# Patient Record
Sex: FEMALE | Race: WHITE | NOT HISPANIC OR LATINO | URBAN - METROPOLITAN AREA
[De-identification: names, ages, dates, MRNs, and addresses within clinical notes are randomized per-mention and may not be internally consistent; named-entity substitution may affect disease eponyms.]

---

## 2023-05-12 ENCOUNTER — TELEPHONE (OUTPATIENT)
Dept: PRIMARY CARE | Facility: CLINIC | Age: 78
End: 2023-05-12
Payer: MEDICARE

## 2023-05-12 DIAGNOSIS — M81.0 OSTEOPOROSIS, UNSPECIFIED OSTEOPOROSIS TYPE, UNSPECIFIED PATHOLOGICAL FRACTURE PRESENCE: ICD-10-CM

## 2023-05-12 DIAGNOSIS — E78.5 HYPERLIPIDEMIA, UNSPECIFIED HYPERLIPIDEMIA TYPE: ICD-10-CM

## 2023-05-12 RX ORDER — ALENDRONATE SODIUM 70 MG/1
TABLET ORAL
Qty: 12 TABLET | Refills: 1 | Status: SHIPPED | OUTPATIENT
Start: 2023-05-12 | End: 2023-10-02

## 2023-05-12 RX ORDER — PRAVASTATIN SODIUM 20 MG/1
20 TABLET ORAL NIGHTLY
Qty: 90 TABLET | Refills: 1 | Status: SHIPPED | OUTPATIENT
Start: 2023-05-12 | End: 2023-10-05

## 2023-06-21 ENCOUNTER — OFFICE VISIT (OUTPATIENT)
Dept: PRIMARY CARE | Facility: CLINIC | Age: 78
End: 2023-06-21
Payer: MEDICARE

## 2023-06-21 VITALS
DIASTOLIC BLOOD PRESSURE: 62 MMHG | WEIGHT: 125 LBS | BODY MASS INDEX: 24.54 KG/M2 | SYSTOLIC BLOOD PRESSURE: 135 MMHG | TEMPERATURE: 97.5 F | OXYGEN SATURATION: 98 % | HEIGHT: 60 IN | HEART RATE: 61 BPM

## 2023-06-21 DIAGNOSIS — M81.0 AGE-RELATED OSTEOPOROSIS WITHOUT CURRENT PATHOLOGICAL FRACTURE: ICD-10-CM

## 2023-06-21 DIAGNOSIS — Z00.00 VISIT FOR PREVENTIVE HEALTH EXAMINATION: Primary | ICD-10-CM

## 2023-06-21 DIAGNOSIS — N18.31 STAGE 3A CHRONIC KIDNEY DISEASE (MULTI): ICD-10-CM

## 2023-06-21 DIAGNOSIS — E78.2 MIXED HYPERLIPIDEMIA: ICD-10-CM

## 2023-06-21 DIAGNOSIS — R05.8 DRY COUGH: ICD-10-CM

## 2023-06-21 PROBLEM — G89.29 CHRONIC LOWER BACK PAIN: Status: ACTIVE | Noted: 2023-06-21

## 2023-06-21 PROBLEM — E78.5 HYPERLIPIDEMIA: Status: ACTIVE | Noted: 2023-06-21

## 2023-06-21 PROBLEM — M17.9 OSTEOARTHRITIS OF KNEE: Status: ACTIVE | Noted: 2023-06-21

## 2023-06-21 PROBLEM — M54.50 CHRONIC LOWER BACK PAIN: Status: ACTIVE | Noted: 2023-06-21

## 2023-06-21 PROCEDURE — 1170F FXNL STATUS ASSESSED: CPT | Performed by: FAMILY MEDICINE

## 2023-06-21 PROCEDURE — G0444 DEPRESSION SCREEN ANNUAL: HCPCS | Performed by: FAMILY MEDICINE

## 2023-06-21 PROCEDURE — 93000 ELECTROCARDIOGRAM COMPLETE: CPT | Performed by: FAMILY MEDICINE

## 2023-06-21 PROCEDURE — 99497 ADVNCD CARE PLAN 30 MIN: CPT | Performed by: FAMILY MEDICINE

## 2023-06-21 PROCEDURE — 1159F MED LIST DOCD IN RCRD: CPT | Performed by: FAMILY MEDICINE

## 2023-06-21 PROCEDURE — 1036F TOBACCO NON-USER: CPT | Performed by: FAMILY MEDICINE

## 2023-06-21 PROCEDURE — 1160F RVW MEDS BY RX/DR IN RCRD: CPT | Performed by: FAMILY MEDICINE

## 2023-06-21 PROCEDURE — 1157F ADVNC CARE PLAN IN RCRD: CPT | Performed by: FAMILY MEDICINE

## 2023-06-21 PROCEDURE — G0439 PPPS, SUBSEQ VISIT: HCPCS | Performed by: FAMILY MEDICINE

## 2023-06-21 PROCEDURE — 99214 OFFICE O/P EST MOD 30 MIN: CPT | Performed by: FAMILY MEDICINE

## 2023-06-21 RX ORDER — GINGER ROOT/GINGER ROOT EXT 262.5 MG
25000 CAPSULE ORAL DAILY
COMMUNITY

## 2023-06-21 RX ORDER — EPINEPHRINE 0.22MG
AEROSOL WITH ADAPTER (ML) INHALATION
COMMUNITY
Start: 2019-06-05

## 2023-06-21 RX ORDER — LUTEIN 6 MG
2 TABLET ORAL DAILY
COMMUNITY
Start: 2019-06-05

## 2023-06-21 RX ORDER — PANTOPRAZOLE SODIUM 40 MG/1
40 TABLET, DELAYED RELEASE ORAL DAILY
Qty: 30 TABLET | Refills: 0 | Status: SHIPPED | OUTPATIENT
Start: 2023-06-21 | End: 2023-08-20

## 2023-06-21 RX ORDER — ZINC GLUCONATE 50 MG
1 TABLET ORAL DAILY
COMMUNITY
Start: 2019-06-05

## 2023-06-21 RX ORDER — CHOLECALCIFEROL (VITAMIN D3) 25 MCG
TABLET ORAL
COMMUNITY
Start: 2016-05-26

## 2023-06-21 RX ORDER — MONTELUKAST SODIUM 10 MG/1
10 TABLET ORAL NIGHTLY
Qty: 30 TABLET | Refills: 0 | Status: SHIPPED | OUTPATIENT
Start: 2023-06-21 | End: 2024-04-10 | Stop reason: SDUPTHER

## 2023-06-21 RX ORDER — ZINC GLUCONATE 100 MG
TABLET ORAL
COMMUNITY
Start: 2019-06-05

## 2023-06-21 RX ORDER — CALCIUM CARBONATE/VITAMIN D3 500-10/5ML
LIQUID (ML) ORAL
COMMUNITY
Start: 2019-06-05

## 2023-06-21 ASSESSMENT — LIFESTYLE VARIABLES
HOW OFTEN DO YOU HAVE A DRINK CONTAINING ALCOHOL: MONTHLY OR LESS
AUDIT-C TOTAL SCORE: 1
SKIP TO QUESTIONS 9-10: 1
HOW MANY STANDARD DRINKS CONTAINING ALCOHOL DO YOU HAVE ON A TYPICAL DAY: 1 OR 2
HOW OFTEN DO YOU HAVE SIX OR MORE DRINKS ON ONE OCCASION: NEVER

## 2023-06-21 ASSESSMENT — PATIENT HEALTH QUESTIONNAIRE - PHQ9
2. FEELING DOWN, DEPRESSED OR HOPELESS: NOT AT ALL
SUM OF ALL RESPONSES TO PHQ9 QUESTIONS 1 AND 2: 0
1. LITTLE INTEREST OR PLEASURE IN DOING THINGS: NOT AT ALL

## 2023-06-21 ASSESSMENT — ACTIVITIES OF DAILY LIVING (ADL)
DRESSING: INDEPENDENT
GROCERY_SHOPPING: INDEPENDENT
MANAGING_FINANCES: INDEPENDENT
TAKING_MEDICATION: INDEPENDENT
DOING_HOUSEWORK: INDEPENDENT
BATHING: INDEPENDENT

## 2023-06-21 NOTE — PROGRESS NOTES
Subjective   Patient ID: Charity Weber is a 77 y.o. female who presents for Medicare Annual Wellness Visit Subsequent.    Assessment/Plan     Problem List Items Addressed This Visit       Visit for preventive health examination - Primary    Hyperlipidemia    Relevant Orders    ECG 12 lead (Clinic Performed)    Osteoporosis    Stage 3a chronic kidney disease     Other Visit Diagnoses       Dry cough        Relevant Medications    montelukast (Singulair) 10 mg tablet    pantoprazole (ProtoNix) 40 mg EC tablet        Mammogram and bone density July 2022 reviewed  eKg today    Received new shingles vaccine  Up-to-date with pneumonia vaccine  Up-to-date with COVID-19 vaccine    cologuard -positive in September 2020 had colonoscopy in November 2020    Patient can call for medication refills  Follow-up in 12 months. Patient understands and in agreement with plan    HPI  77-year-old female here for Medicare wellness visit      follow-up on on recent CT colonography which was done as patient could not complete colonoscopy the result for CT colonography did not reveal any abnormalities except mild diverticulosis.   Echocardiogram showed EF of 55 to 60% small pericardial effusion       hyperlipidemia, vitamin D deficiency, chronic low back pain  Osteoporosis/osteopenia  Chronic kidney disease stage III  No new sign and symptoms      Revent labs done on 5/24/23 - reviewed  cbc cmp lipid us uric acid WNL  CKD 3 stage gfr 47    C/o on and off dry cough for a long time will consider singulair and ppi for  a month      Advance directives:. Advanced Care Planning discussed and documented advance care plan or surrogate decision maker documented in the medical record.   A Conversation about Advance directives of at least 16 minutes has occurred. Actual time spent: I spent >16 minutes discussing Advance Care Planning including the explanation and discussion of advance directives. If patient does not have current up to date documents,  examples and information provided on how to create both Living Will and Power of . Patient was encouraged to work on completing these documents.  Conversation with: The conversation with the patient and/or participants was voluntary.  Documents discussed and/or completed: Living Will was discussed with participants. Healthcare POA was discussed with participants. Patient educated on how to obtain Living Will and Power of . Discussed patient end of life choices.   Patient has living will and patient's -Bladimir daughter is healthcare power of .  Patient is full code.    Allergies   Allergen Reactions    Methylprednisolone Unknown    Silicone Unknown       Current Outpatient Medications   Medication Sig Dispense Refill    alendronate (Fosamax) 70 mg tablet TAKE 1 TABLET BY MOUTH ONCE WEEKLY 12 tablet 1    beta carotene (Vitamin A) 7,500 mcg (25,000 unit) capsule Take 1 capsule (25,000 Units) by mouth once daily.      cholecalciferol (Vitamin D-3) 25 MCG (1000 UT) tablet Take by mouth.      coenzyme Q-10 100 mg capsule Take by mouth.      copper gluconate 2 mg capsule Take by mouth.      fish oil concentrate (Omega-3) 120-180 mg capsule Take by mouth once daily.      lutein 6 mg tablet Take 2 tablets by mouth once daily.      phytonadione, vit K1, (vitamin k) 100 mcg tablet Take by mouth.      pravastatin (Pravachol) 20 mg tablet Take 1 tablet (20 mg) by mouth once daily at bedtime. 90 tablet 1    zinc gluconate 50 mg tablet Take 1 tablet (50 mg) by mouth once daily.      montelukast (Singulair) 10 mg tablet Take 1 tablet (10 mg) by mouth once daily at bedtime. 30 tablet 0    pantoprazole (ProtoNix) 40 mg EC tablet Take 1 tablet (40 mg) by mouth once daily. Do not crush, chew, or split. 30 tablet 0     No current facility-administered medications for this visit.       Objective   Visit Vitals  /62 (BP Location: Right arm, Patient Position: Sitting)   Pulse 61   Temp 36.4 °C (97.5 °F)    Ht 1.524 m (5')   Wt 56.7 kg (125 lb)   SpO2 98%   BMI 24.41 kg/m²   Smoking Status Never   BSA 1.55 m²     Physical Exam  Constitutional:       General: He is not in acute distress.     Appearance: Normal appearance.   HENT:      Head: Normocephalic and atraumatic.      Nose: Nose normal.   Eyes:      Extraocular Movements: Extraocular movements intact.      Conjunctiva/sclera: Conjunctivae normal.   Cardiovascular:      Rate and Rhythm: Normal rate and regular rhythm.   Pulmonary:      Effort: Pulmonary effort is normal.      Breath sounds: Normal breath sounds.   Skin:     General: Skin is warm.   Neurological:      Mental Status: He is alert and oriented to person, place, and time.   Psychiatric:         Mood and Affect: Mood normal.         Behavior: Behavior normal.   Immunization History   Administered Date(s) Administered    Moderna SARS-CoV-2 Vaccination 04/07/2021, 05/05/2021, 11/11/2021, 04/04/2022       Review of Systems    No visits with results within 4 Month(s) from this visit.   Latest known visit with results is:   Legacy Encounter on 06/15/2022   Component Date Value Ref Range Status    Vitamin D, 25-Hydroxy 06/15/2022 75  ng/mL Final    Vitamin B-12 06/15/2022 605  211 - 911 pg/mL Final    TSH 06/15/2022 1.79  0.44 - 3.98 mIU/L Final       Radiology: Reviewed imaging in powerchart.  No results found.    No family history on file.  Social History     Socioeconomic History    Marital status:      Spouse name: None    Number of children: None    Years of education: None    Highest education level: None   Occupational History    None   Tobacco Use    Smoking status: Never    Smokeless tobacco: Never   Substance and Sexual Activity    Alcohol use: Not Currently    Drug use: Never    Sexual activity: None   Other Topics Concern    None   Social History Narrative    None     Social Determinants of Health     Financial Resource Strain: Not on file   Food Insecurity: Not on file   Transportation  Needs: Not on file   Physical Activity: Not on file   Stress: Not on file   Social Connections: Not on file   Intimate Partner Violence: Not on file   Housing Stability: Not on file     Past Medical History:   Diagnosis Date    Personal history of other endocrine, nutritional and metabolic disease 11/03/2016    History of hyperlipidemia     Past Surgical History:   Procedure Laterality Date    BREAST LUMPECTOMY  05/16/2014    Left Breast Lumpectomy    FOOT SURGERY  05/16/2014    Foot Surgery    HYSTERECTOMY  05/16/2014    Hysterectomy    KNEE ARTHROSCOPY W/ DEBRIDEMENT  05/16/2014    Arthroscopy Knee Left    OTHER SURGICAL HISTORY  05/16/2014    Left Breast Biopsy During Breast Surgery       Charting was completed using voice recognition technology and may include unintended errors.

## 2023-10-01 DIAGNOSIS — M81.0 OSTEOPOROSIS, UNSPECIFIED OSTEOPOROSIS TYPE, UNSPECIFIED PATHOLOGICAL FRACTURE PRESENCE: ICD-10-CM

## 2023-10-02 RX ORDER — ALENDRONATE SODIUM 70 MG/1
TABLET ORAL
Qty: 12 TABLET | Refills: 2 | Status: SHIPPED | OUTPATIENT
Start: 2023-10-02 | End: 2023-10-23 | Stop reason: SDUPTHER

## 2023-10-05 DIAGNOSIS — E78.5 HYPERLIPIDEMIA, UNSPECIFIED HYPERLIPIDEMIA TYPE: ICD-10-CM

## 2023-10-05 RX ORDER — PRAVASTATIN SODIUM 20 MG/1
20 TABLET ORAL NIGHTLY
Qty: 90 TABLET | Refills: 2 | Status: SHIPPED | OUTPATIENT
Start: 2023-10-05 | End: 2023-10-23 | Stop reason: SDUPTHER

## 2023-10-23 DIAGNOSIS — E78.5 HYPERLIPIDEMIA, UNSPECIFIED HYPERLIPIDEMIA TYPE: ICD-10-CM

## 2023-10-23 DIAGNOSIS — M81.0 OSTEOPOROSIS, UNSPECIFIED OSTEOPOROSIS TYPE, UNSPECIFIED PATHOLOGICAL FRACTURE PRESENCE: ICD-10-CM

## 2023-10-23 RX ORDER — ALENDRONATE SODIUM 70 MG/1
TABLET ORAL
Qty: 12 TABLET | Refills: 2 | Status: SHIPPED | OUTPATIENT
Start: 2023-10-23

## 2023-10-23 RX ORDER — PRAVASTATIN SODIUM 20 MG/1
20 TABLET ORAL NIGHTLY
Qty: 90 TABLET | Refills: 2 | Status: SHIPPED | OUTPATIENT
Start: 2023-10-23 | End: 2024-05-29 | Stop reason: SDUPTHER

## 2024-03-12 ENCOUNTER — TELEPHONE (OUTPATIENT)
Dept: PRIMARY CARE | Facility: CLINIC | Age: 79
End: 2024-03-12

## 2024-03-12 ENCOUNTER — OFFICE VISIT (OUTPATIENT)
Dept: PRIMARY CARE | Facility: CLINIC | Age: 79
End: 2024-03-12
Payer: MEDICARE

## 2024-03-12 VITALS
HEART RATE: 79 BPM | BODY MASS INDEX: 24.54 KG/M2 | WEIGHT: 125 LBS | SYSTOLIC BLOOD PRESSURE: 137 MMHG | DIASTOLIC BLOOD PRESSURE: 61 MMHG | OXYGEN SATURATION: 96 % | TEMPERATURE: 97.3 F | HEIGHT: 60 IN

## 2024-03-12 DIAGNOSIS — B96.89 ACUTE BRONCHITIS, BACTERIAL: Primary | ICD-10-CM

## 2024-03-12 DIAGNOSIS — J20.8 ACUTE BRONCHITIS, BACTERIAL: Primary | ICD-10-CM

## 2024-03-12 PROCEDURE — 1036F TOBACCO NON-USER: CPT | Performed by: FAMILY MEDICINE

## 2024-03-12 PROCEDURE — 99213 OFFICE O/P EST LOW 20 MIN: CPT | Performed by: FAMILY MEDICINE

## 2024-03-12 PROCEDURE — 1157F ADVNC CARE PLAN IN RCRD: CPT | Performed by: FAMILY MEDICINE

## 2024-03-12 PROCEDURE — 1160F RVW MEDS BY RX/DR IN RCRD: CPT | Performed by: FAMILY MEDICINE

## 2024-03-12 PROCEDURE — 1159F MED LIST DOCD IN RCRD: CPT | Performed by: FAMILY MEDICINE

## 2024-03-12 RX ORDER — BENZONATATE 100 MG/1
200 CAPSULE ORAL 3 TIMES DAILY PRN
Qty: 30 CAPSULE | Refills: 0 | Status: SHIPPED | OUTPATIENT
Start: 2024-03-12 | End: 2024-03-17

## 2024-03-12 RX ORDER — BROMPHENIRAMINE MALEATE, PSEUDOEPHEDRINE HYDROCHLORIDE, AND DEXTROMETHORPHAN HYDROBROMIDE 2; 30; 10 MG/5ML; MG/5ML; MG/5ML
5 SYRUP ORAL 4 TIMES DAILY PRN
Qty: 120 ML | Refills: 0 | Status: SHIPPED | OUTPATIENT
Start: 2024-03-12 | End: 2024-03-22

## 2024-03-12 RX ORDER — DOXYCYCLINE 100 MG/1
100 CAPSULE ORAL 2 TIMES DAILY
Qty: 20 CAPSULE | Refills: 0 | Status: SHIPPED | OUTPATIENT
Start: 2024-03-12 | End: 2024-03-22

## 2024-03-12 NOTE — PROGRESS NOTES
Subjective   Patient ID: Charity Weber is a 78 y.o. female who presents for Nasal Congestion and Cough.    Assessment/Plan     Problem List Items Addressed This Visit    None      HPI    78-year-old female complaining of cough congestion phlegm production runny stuffy nose going on since last 5 days without any improvement using over-the-counter medication    No fever chills  Complaining of headache but no dizziness confusion    No vomiting or diarrhea    Denies any chest pain shortness of breath      Most likely acute bronchitis we will consider above medication advised patient to use Singulair Protonix the day provided all we will consider doxycycline with her symptomatic treatment patient agrees call us or follow-up if symptoms are worsening    Allergies   Allergen Reactions    Methylprednisolone Unknown    Silicone Unknown       Current Outpatient Medications   Medication Sig Dispense Refill    alendronate (Fosamax) 70 mg tablet TAKE 1 TABLET BY MOUTH WEEKLY 12 tablet 2    beta carotene (Vitamin A) 7,500 mcg (25,000 unit) capsule Take 1 capsule (25,000 Units) by mouth once daily.      cholecalciferol (Vitamin D-3) 25 MCG (1000 UT) tablet Take by mouth.      coenzyme Q-10 100 mg capsule Take by mouth.      copper gluconate 2 mg capsule Take by mouth.      fish oil concentrate (Omega-3) 120-180 mg capsule Take by mouth once daily.      lutein 6 mg tablet Take 2 tablets by mouth once daily.      phytonadione, vit K1, (vitamin k) 100 mcg tablet Take by mouth.      pravastatin (Pravachol) 20 mg tablet Take 1 tablet (20 mg) by mouth once daily at bedtime. 90 tablet 2    zinc gluconate 50 mg tablet Take 1 tablet (50 mg) by mouth once daily.      montelukast (Singulair) 10 mg tablet Take 1 tablet (10 mg) by mouth once daily at bedtime. 30 tablet 0    pantoprazole (ProtoNix) 40 mg EC tablet Take 1 tablet (40 mg) by mouth once daily. Do not crush, chew, or split. 30 tablet 0     No current facility-administered medications  for this visit.       Objective   Visit Vitals  /61 (BP Location: Left arm, Patient Position: Sitting, BP Cuff Size: Adult)   Pulse 79   Temp 36.3 °C (97.3 °F)   Ht 1.524 m (5')   Wt 56.7 kg (125 lb)   SpO2 96%   BMI 24.41 kg/m²   Smoking Status Never   BSA 1.55 m²     Physical Exam  Constitutional:       General: He is not in acute distress.     Appearance: Normal appearance.   HENT:      Head: Normocephalic and atraumatic.      Nose: Nose normal.   Eyes:      Extraocular Movements: Extraocular movements intact.      Conjunctiva/sclera: Conjunctivae normal.   Cardiovascular:      Rate and Rhythm: Normal rate and regular rhythm.   Pulmonary:      Effort: Pulmonary effort is normal.      Breath sounds: Normal breath sounds.   Skin:     General: Skin is warm.   Neurological:      Mental Status: He is alert and oriented to person, place, and time.   Psychiatric:         Mood and Affect: Mood normal.         Behavior: Behavior normal.     Posterior pharyngeal wall erythema present    Bilateral nasal mucosa edema erythema present  Immunization History   Administered Date(s) Administered    Moderna COVID-19 vaccine, Fall 2023, 12 yeasrs and older (50mcg/0.5mL) 10/29/2023    Moderna COVID-19 vaccine, bivalent, blue cap/gray label *Check age/dose* 09/28/2022    Moderna SARS-CoV-2 Vaccination 04/07/2021, 05/05/2021, 11/11/2021, 04/04/2022       Review of Systems    No visits with results within 4 Month(s) from this visit.   Latest known visit with results is:   Legacy Encounter on 06/15/2022   Component Date Value Ref Range Status    Vitamin D, 25-Hydroxy 06/15/2022 75  ng/mL Final    Vitamin B-12 06/15/2022 605  211 - 911 pg/mL Final    TSH 06/15/2022 1.79  0.44 - 3.98 mIU/L Final       Radiology: Reviewed imaging in powerchart.  No results found.    No family history on file.  Social History     Socioeconomic History    Marital status:      Spouse name: None    Number of children: None    Years of education:  None    Highest education level: None   Occupational History    None   Tobacco Use    Smoking status: Never    Smokeless tobacco: Never   Substance and Sexual Activity    Alcohol use: Not Currently    Drug use: Never    Sexual activity: None   Other Topics Concern    None   Social History Narrative    None     Social Determinants of Health     Financial Resource Strain: Not on file   Food Insecurity: Not on file   Transportation Needs: Not on file   Physical Activity: Not on file   Stress: Not on file   Social Connections: Not on file   Intimate Partner Violence: Not on file   Housing Stability: Not on file     Past Medical History:   Diagnosis Date    Personal history of other endocrine, nutritional and metabolic disease 11/03/2016    History of hyperlipidemia     Past Surgical History:   Procedure Laterality Date    BREAST LUMPECTOMY  05/16/2014    Left Breast Lumpectomy    FOOT SURGERY  05/16/2014    Foot Surgery    HYSTERECTOMY  05/16/2014    Hysterectomy    KNEE ARTHROSCOPY W/ DEBRIDEMENT  05/16/2014    Arthroscopy Knee Left    OTHER SURGICAL HISTORY  05/16/2014    Left Breast Biopsy During Breast Surgery       Charting was completed using voice recognition technology and may include unintended errors.

## 2024-03-14 ENCOUNTER — TELEPHONE (OUTPATIENT)
Dept: PRIMARY CARE | Facility: CLINIC | Age: 79
End: 2024-03-14
Payer: MEDICARE

## 2024-03-14 NOTE — TELEPHONE ENCOUNTER
Pt wants to know if she can take cold and flu (Tylenol) medication and nyquil at night with the new five medications that she was recently given

## 2024-03-18 ENCOUNTER — TELEPHONE (OUTPATIENT)
Dept: PRIMARY CARE | Facility: CLINIC | Age: 79
End: 2024-03-18
Payer: MEDICARE

## 2024-03-19 NOTE — TELEPHONE ENCOUNTER
PT is calling back and stated she was told to stop the new medications prescribed on 3/12 from Dr Hopper because she was throwing up and just take the antibiotic.    She is returning your call.

## 2024-04-10 ENCOUNTER — LAB (OUTPATIENT)
Dept: LAB | Facility: LAB | Age: 79
End: 2024-04-10
Payer: MEDICARE

## 2024-04-10 ENCOUNTER — OFFICE VISIT (OUTPATIENT)
Dept: PRIMARY CARE | Facility: CLINIC | Age: 79
End: 2024-04-10
Payer: MEDICARE

## 2024-04-10 ENCOUNTER — TELEPHONE (OUTPATIENT)
Dept: PRIMARY CARE | Facility: CLINIC | Age: 79
End: 2024-04-10

## 2024-04-10 VITALS
BODY MASS INDEX: 24.74 KG/M2 | WEIGHT: 126 LBS | DIASTOLIC BLOOD PRESSURE: 70 MMHG | SYSTOLIC BLOOD PRESSURE: 120 MMHG | HEART RATE: 78 BPM | HEIGHT: 60 IN | OXYGEN SATURATION: 97 % | TEMPERATURE: 97.4 F

## 2024-04-10 DIAGNOSIS — R05.1 ACUTE COUGH: Primary | ICD-10-CM

## 2024-04-10 DIAGNOSIS — R05.1 ACUTE COUGH: ICD-10-CM

## 2024-04-10 DIAGNOSIS — R05.8 DRY COUGH: ICD-10-CM

## 2024-04-10 LAB
ANION GAP SERPL CALC-SCNC: 13 MMOL/L (ref 10–20)
BASOPHILS # BLD AUTO: 0.04 X10*3/UL (ref 0–0.1)
BASOPHILS NFR BLD AUTO: 0.8 %
BUN SERPL-MCNC: 14 MG/DL (ref 6–23)
CALCIUM SERPL-MCNC: 9.7 MG/DL (ref 8.6–10.6)
CHLORIDE SERPL-SCNC: 105 MMOL/L (ref 98–107)
CO2 SERPL-SCNC: 28 MMOL/L (ref 21–32)
CREAT SERPL-MCNC: 0.97 MG/DL (ref 0.5–1.05)
CRP SERPL-MCNC: 0.12 MG/DL
EGFRCR SERPLBLD CKD-EPI 2021: 60 ML/MIN/1.73M*2
EOSINOPHIL # BLD AUTO: 0.09 X10*3/UL (ref 0–0.4)
EOSINOPHIL NFR BLD AUTO: 1.8 %
ERYTHROCYTE [DISTWIDTH] IN BLOOD BY AUTOMATED COUNT: 12.7 % (ref 11.5–14.5)
GLUCOSE SERPL-MCNC: 95 MG/DL (ref 74–99)
HCT VFR BLD AUTO: 36.3 % (ref 36–46)
HGB BLD-MCNC: 12.3 G/DL (ref 12–16)
IMM GRANULOCYTES # BLD AUTO: 0.01 X10*3/UL (ref 0–0.5)
IMM GRANULOCYTES NFR BLD AUTO: 0.2 % (ref 0–0.9)
LYMPHOCYTES # BLD AUTO: 1.17 X10*3/UL (ref 0.8–3)
LYMPHOCYTES NFR BLD AUTO: 23 %
MCH RBC QN AUTO: 32.6 PG (ref 26–34)
MCHC RBC AUTO-ENTMCNC: 33.9 G/DL (ref 32–36)
MCV RBC AUTO: 96 FL (ref 80–100)
MONOCYTES # BLD AUTO: 0.38 X10*3/UL (ref 0.05–0.8)
MONOCYTES NFR BLD AUTO: 7.5 %
NEUTROPHILS # BLD AUTO: 3.4 X10*3/UL (ref 1.6–5.5)
NEUTROPHILS NFR BLD AUTO: 66.7 %
NRBC BLD-RTO: 0 /100 WBCS (ref 0–0)
PLATELET # BLD AUTO: 265 X10*3/UL (ref 150–450)
POTASSIUM SERPL-SCNC: 4.2 MMOL/L (ref 3.5–5.3)
RBC # BLD AUTO: 3.77 X10*6/UL (ref 4–5.2)
SODIUM SERPL-SCNC: 142 MMOL/L (ref 136–145)
WBC # BLD AUTO: 5.1 X10*3/UL (ref 4.4–11.3)

## 2024-04-10 PROCEDURE — 1160F RVW MEDS BY RX/DR IN RCRD: CPT | Performed by: FAMILY MEDICINE

## 2024-04-10 PROCEDURE — 1157F ADVNC CARE PLAN IN RCRD: CPT | Performed by: FAMILY MEDICINE

## 2024-04-10 PROCEDURE — 85025 COMPLETE CBC W/AUTO DIFF WBC: CPT

## 2024-04-10 PROCEDURE — 99213 OFFICE O/P EST LOW 20 MIN: CPT | Performed by: FAMILY MEDICINE

## 2024-04-10 PROCEDURE — 86140 C-REACTIVE PROTEIN: CPT

## 2024-04-10 PROCEDURE — 1036F TOBACCO NON-USER: CPT | Performed by: FAMILY MEDICINE

## 2024-04-10 PROCEDURE — 80048 BASIC METABOLIC PNL TOTAL CA: CPT

## 2024-04-10 PROCEDURE — 36415 COLL VENOUS BLD VENIPUNCTURE: CPT

## 2024-04-10 PROCEDURE — 1159F MED LIST DOCD IN RCRD: CPT | Performed by: FAMILY MEDICINE

## 2024-04-10 RX ORDER — CODEINE PHOSPHATE AND GUAIFENESIN 10; 100 MG/5ML; MG/5ML
5 SOLUTION ORAL EVERY 6 HOURS PRN
Qty: 140 ML | Refills: 0 | Status: SHIPPED | OUTPATIENT
Start: 2024-04-10 | End: 2024-04-17

## 2024-04-10 RX ORDER — AZITHROMYCIN 250 MG/1
TABLET, FILM COATED ORAL DAILY
Qty: 6 TABLET | Refills: 0 | Status: SHIPPED | OUTPATIENT
Start: 2024-04-10 | End: 2024-04-14

## 2024-04-10 RX ORDER — CETIRIZINE HYDROCHLORIDE 10 MG/1
10 TABLET ORAL DAILY
Qty: 30 TABLET | Refills: 0 | Status: SHIPPED | OUTPATIENT
Start: 2024-04-10 | End: 2024-07-09

## 2024-04-10 RX ORDER — MONTELUKAST SODIUM 10 MG/1
10 TABLET ORAL NIGHTLY
Qty: 30 TABLET | Refills: 0 | Status: SHIPPED | OUTPATIENT
Start: 2024-04-10 | End: 2024-10-07

## 2024-04-10 RX ORDER — AZELASTINE 1 MG/ML
1 SPRAY, METERED NASAL 2 TIMES DAILY
Qty: 30 ML | Refills: 0 | Status: SHIPPED | OUTPATIENT
Start: 2024-04-10 | End: 2025-04-10

## 2024-04-10 NOTE — PROGRESS NOTES
Subjective   Patient ID: Charity Weber is a 78 y.o. female who presents for No chief complaint on file..    Assessment/Plan     Problem List Items Addressed This Visit    None      HPI    78-year-old female complaining of cough congestion phlegm production runny stuffy nose going on since last 5 days without any improvement using over-the-counter medication    No fever chills  Complaining of headache but no dizziness confusion    No vomiting or diarrhea    Denies any chest pain shortness of breath    With bronchitis previously doxycycline and symptomatic treatment tried  Patient states she is still coughing complaining a little shortness of breath    But no fever chills no phlegm production  Will consider above medication and initiate antibiotic only if symptoms are worsening  Patient agrees and if symptoms does not improve will consider CT chest with IV contrast  Lab work today    Allergies   Allergen Reactions    Methylprednisolone Unknown    Silicone Unknown       Current Outpatient Medications   Medication Sig Dispense Refill    alendronate (Fosamax) 70 mg tablet TAKE 1 TABLET BY MOUTH WEEKLY 12 tablet 2    beta carotene (Vitamin A) 7,500 mcg (25,000 unit) capsule Take 1 capsule (25,000 Units) by mouth once daily.      cholecalciferol (Vitamin D-3) 25 MCG (1000 UT) tablet Take by mouth.      coenzyme Q-10 100 mg capsule Take by mouth.      copper gluconate 2 mg capsule Take by mouth.      fish oil concentrate (Omega-3) 120-180 mg capsule Take by mouth once daily.      lutein 6 mg tablet Take 2 tablets by mouth once daily.      montelukast (Singulair) 10 mg tablet Take 1 tablet (10 mg) by mouth once daily at bedtime. 30 tablet 0    pantoprazole (ProtoNix) 40 mg EC tablet Take 1 tablet (40 mg) by mouth once daily. Do not crush, chew, or split. 30 tablet 0    phytonadione, vit K1, (vitamin k) 100 mcg tablet Take by mouth.      pravastatin (Pravachol) 20 mg tablet Take 1 tablet (20 mg) by mouth once daily at bedtime.  90 tablet 2    zinc gluconate 50 mg tablet Take 1 tablet (50 mg) by mouth once daily.       No current facility-administered medications for this visit.       Objective   Visit Vitals  Smoking Status Never     Physical Exam  Constitutional:       General: He is not in acute distress.     Appearance: Normal appearance.   HENT:      Head: Normocephalic and atraumatic.      Nose: Nose normal.   Eyes:      Extraocular Movements: Extraocular movements intact.      Conjunctiva/sclera: Conjunctivae normal.   Cardiovascular:      Rate and Rhythm: Normal rate and regular rhythm.   Pulmonary:      Effort: Pulmonary effort is normal.      Breath sounds: Normal breath sounds.   Skin:     General: Skin is warm.   Neurological:      Mental Status: He is alert and oriented to person, place, and time.   Psychiatric:         Mood and Affect: Mood normal.         Behavior: Behavior normal.     Posterior pharyngeal wall erythema present    Bilateral nasal mucosa edema erythema present  Immunization History   Administered Date(s) Administered    Moderna COVID-19 vaccine, Fall 2023, 12 yeasrs and older (50mcg/0.5mL) 10/29/2023    Moderna COVID-19 vaccine, bivalent, blue cap/gray label *Check age/dose* 09/28/2022    Moderna SARS-CoV-2 Vaccination 04/07/2021, 05/05/2021, 11/11/2021, 04/04/2022       Review of Systems    No visits with results within 4 Month(s) from this visit.   Latest known visit with results is:   Legacy Encounter on 06/15/2022   Component Date Value Ref Range Status    Vitamin D, 25-Hydroxy 06/15/2022 75  ng/mL Final    Vitamin B-12 06/15/2022 605  211 - 911 pg/mL Final    TSH 06/15/2022 1.79  0.44 - 3.98 mIU/L Final       Radiology: Reviewed imaging in powerchart.  No results found.    No family history on file.  Social History     Socioeconomic History    Marital status:      Spouse name: Not on file    Number of children: Not on file    Years of education: Not on file    Highest education level: Not on file    Occupational History    Not on file   Tobacco Use    Smoking status: Never    Smokeless tobacco: Never   Substance and Sexual Activity    Alcohol use: Not Currently    Drug use: Never    Sexual activity: Not on file   Other Topics Concern    Not on file   Social History Narrative    Not on file     Social Determinants of Health     Financial Resource Strain: Not on file   Food Insecurity: Not on file   Transportation Needs: Not on file   Physical Activity: Not on file   Stress: Not on file   Social Connections: Not on file   Intimate Partner Violence: Not on file   Housing Stability: Not on file     Past Medical History:   Diagnosis Date    Personal history of other endocrine, nutritional and metabolic disease 11/03/2016    History of hyperlipidemia     Past Surgical History:   Procedure Laterality Date    BREAST LUMPECTOMY  05/16/2014    Left Breast Lumpectomy    FOOT SURGERY  05/16/2014    Foot Surgery    HYSTERECTOMY  05/16/2014    Hysterectomy    KNEE ARTHROSCOPY W/ DEBRIDEMENT  05/16/2014    Arthroscopy Knee Left    OTHER SURGICAL HISTORY  05/16/2014    Left Breast Biopsy During Breast Surgery       Charting was completed using voice recognition technology and may include unintended errors.

## 2024-04-10 NOTE — TELEPHONE ENCOUNTER
PT IS CONFUSED ON WHICH MEDICATION SHE IS SUPPOSED WAIT AND TAKE STARTING ON FRIDAY. SHOULD CONTINUE OTHER MEDS. PLEASE CALL

## 2024-04-15 ENCOUNTER — TELEPHONE (OUTPATIENT)
Dept: PRIMARY CARE | Facility: CLINIC | Age: 79
End: 2024-04-15
Payer: MEDICARE

## 2024-04-15 NOTE — TELEPHONE ENCOUNTER
Patient calling to let Dr. Hopper know she is feeling 99% but she is constipated now. Wanted to know if any recommendations.   Also she wanted to know if she is ok to complete all medications prescribed on 4/10 or if she is ok to stop.

## 2024-05-29 DIAGNOSIS — E78.5 HYPERLIPIDEMIA, UNSPECIFIED HYPERLIPIDEMIA TYPE: ICD-10-CM

## 2024-05-29 RX ORDER — PRAVASTATIN SODIUM 20 MG/1
20 TABLET ORAL NIGHTLY
Qty: 90 TABLET | Refills: 2 | Status: SHIPPED | OUTPATIENT
Start: 2024-05-29

## 2024-06-24 ENCOUNTER — APPOINTMENT (OUTPATIENT)
Dept: PRIMARY CARE | Facility: CLINIC | Age: 79
End: 2024-06-24
Payer: MEDICARE

## 2024-06-24 VITALS
BODY MASS INDEX: 25.13 KG/M2 | SYSTOLIC BLOOD PRESSURE: 139 MMHG | HEIGHT: 60 IN | TEMPERATURE: 97 F | OXYGEN SATURATION: 100 % | HEART RATE: 65 BPM | WEIGHT: 128 LBS | DIASTOLIC BLOOD PRESSURE: 57 MMHG

## 2024-06-24 DIAGNOSIS — Z00.00 VISIT FOR PREVENTIVE HEALTH EXAMINATION: Primary | ICD-10-CM

## 2024-06-24 DIAGNOSIS — Z78.0 ENCOUNTER FOR OSTEOPOROSIS SCREENING IN ASYMPTOMATIC POSTMENOPAUSAL PATIENT: ICD-10-CM

## 2024-06-24 DIAGNOSIS — Z13.820 ENCOUNTER FOR OSTEOPOROSIS SCREENING IN ASYMPTOMATIC POSTMENOPAUSAL PATIENT: ICD-10-CM

## 2024-06-24 DIAGNOSIS — H35.3221 EXUDATIVE AGE-RELATED MACULAR DEGENERATION, LEFT EYE, WITH ACTIVE CHOROIDAL NEOVASCULARIZATION (MULTI): ICD-10-CM

## 2024-06-24 DIAGNOSIS — N18.31 STAGE 3A CHRONIC KIDNEY DISEASE (MULTI): ICD-10-CM

## 2024-06-24 DIAGNOSIS — M81.0 OSTEOPOROSIS, UNSPECIFIED OSTEOPOROSIS TYPE, UNSPECIFIED PATHOLOGICAL FRACTURE PRESENCE: ICD-10-CM

## 2024-06-24 DIAGNOSIS — Z12.31 ENCOUNTER FOR SCREENING MAMMOGRAM FOR MALIGNANT NEOPLASM OF BREAST: ICD-10-CM

## 2024-06-24 DIAGNOSIS — E78.5 HYPERLIPIDEMIA, UNSPECIFIED HYPERLIPIDEMIA TYPE: ICD-10-CM

## 2024-06-24 PROCEDURE — 1123F ACP DISCUSS/DSCN MKR DOCD: CPT | Performed by: FAMILY MEDICINE

## 2024-06-24 PROCEDURE — 1158F ADVNC CARE PLAN TLK DOCD: CPT | Performed by: FAMILY MEDICINE

## 2024-06-24 PROCEDURE — 1157F ADVNC CARE PLAN IN RCRD: CPT | Performed by: FAMILY MEDICINE

## 2024-06-24 PROCEDURE — G0439 PPPS, SUBSEQ VISIT: HCPCS | Performed by: FAMILY MEDICINE

## 2024-06-24 PROCEDURE — 1160F RVW MEDS BY RX/DR IN RCRD: CPT | Performed by: FAMILY MEDICINE

## 2024-06-24 PROCEDURE — 99497 ADVNCD CARE PLAN 30 MIN: CPT | Performed by: FAMILY MEDICINE

## 2024-06-24 PROCEDURE — G0444 DEPRESSION SCREEN ANNUAL: HCPCS | Performed by: FAMILY MEDICINE

## 2024-06-24 PROCEDURE — 1036F TOBACCO NON-USER: CPT | Performed by: FAMILY MEDICINE

## 2024-06-24 PROCEDURE — 1159F MED LIST DOCD IN RCRD: CPT | Performed by: FAMILY MEDICINE

## 2024-06-24 PROCEDURE — 99214 OFFICE O/P EST MOD 30 MIN: CPT | Performed by: FAMILY MEDICINE

## 2024-06-24 PROCEDURE — 1170F FXNL STATUS ASSESSED: CPT | Performed by: FAMILY MEDICINE

## 2024-06-24 RX ORDER — PRAVASTATIN SODIUM 20 MG/1
20 TABLET ORAL NIGHTLY
Qty: 90 TABLET | Refills: 2 | Status: SHIPPED | OUTPATIENT
Start: 2024-06-24

## 2024-06-24 RX ORDER — ALENDRONATE SODIUM 70 MG/1
TABLET ORAL
Qty: 12 TABLET | Refills: 2 | Status: SHIPPED | OUTPATIENT
Start: 2024-06-24

## 2024-06-24 ASSESSMENT — ACTIVITIES OF DAILY LIVING (ADL)
DRESSING: INDEPENDENT
BATHING: INDEPENDENT
MANAGING_FINANCES: INDEPENDENT
TAKING_MEDICATION: INDEPENDENT
GROCERY_SHOPPING: INDEPENDENT
DOING_HOUSEWORK: INDEPENDENT

## 2024-06-24 ASSESSMENT — PATIENT HEALTH QUESTIONNAIRE - PHQ9
SUM OF ALL RESPONSES TO PHQ9 QUESTIONS 1 AND 2: 0
2. FEELING DOWN, DEPRESSED OR HOPELESS: NOT AT ALL
2. FEELING DOWN, DEPRESSED OR HOPELESS: NOT AT ALL
1. LITTLE INTEREST OR PLEASURE IN DOING THINGS: NOT AT ALL
SUM OF ALL RESPONSES TO PHQ9 QUESTIONS 1 AND 2: 0
1. LITTLE INTEREST OR PLEASURE IN DOING THINGS: NOT AT ALL

## 2024-06-24 NOTE — PROGRESS NOTES
Subjective   Patient ID: Charity Weber is a 78 y.o. female who presents for Medicare Annual Wellness Visit Subsequent.    Assessment/Plan     Problem List Items Addressed This Visit       Visit for preventive health examination - Primary    Hyperlipidemia    Relevant Medications    pravastatin (Pravachol) 20 mg tablet    Osteoporosis    Relevant Medications    alendronate (Fosamax) 70 mg tablet     Other Visit Diagnoses       Encounter for screening mammogram for malignant neoplasm of breast        Relevant Orders    BI mammo bilateral screening tomosynthesis    Encounter for osteoporosis screening in asymptomatic postmenopausal patient        Relevant Orders    XR DEXA bone density        Mammogram and bone density July 2022 reviewed requisition provided    Time spent around 10 minutes obtaining and discussing depression screening using PHQ 9 questions with results documented in the chart      Received new shingles vaccine  Up-to-date with pneumonia vaccine  Up-to-date with COVID-19 vaccine    cologuard -positive in September 2020 had colonoscopy in November 2020  Declined further colon cancer screening  Patient can call for medication refills  Follow-up in 12 months. Patient understands and in agreement with plan    HPI  78-year-old female here for Medicare wellness visit      follow-up on on recent CT colonography which was done as patient could not complete colonoscopy the result for CT colonography did not reveal any abnormalities except mild diverticulosis.   Echocardiogram showed EF of 55 to 60% small pericardial effusion       hyperlipidemia, vitamin D deficiency, chronic low back pain  Osteoporosis/osteopenia  Chronic kidney disease stage III  No new sign and symptoms     Recent lab work done reviewed scanned          Advance directives:. Advanced Care Planning discussed and documented advance care plan or surrogate decision maker documented in the medical record.   A Conversation about Advance directives of  at least 16 minutes has occurred. Actual time spent: I spent >16 minutes discussing Advance Care Planning including the explanation and discussion of advance directives. If patient does not have current up to date documents, examples and information provided on how to create both Living Will and Power of . Patient was encouraged to work on completing these documents.  Conversation with: The conversation with the patient and/or participants was voluntary.  Documents discussed and/or completed: Living Will was discussed with participants. Healthcare POA was discussed with participants. Patient educated on how to obtain Living Will and Power of . Discussed patient end of life choices.   Patient has living will and patient's -Bladimir daughter is healthcare power of .  Patient is full code.    Allergies   Allergen Reactions    Methylprednisolone Unknown    Silicone Unknown       Current Outpatient Medications   Medication Sig Dispense Refill    beta carotene (Vitamin A) 7,500 mcg (25,000 unit) capsule Take 1 capsule (25,000 Units) by mouth once daily.      cholecalciferol (Vitamin D-3) 25 MCG (1000 UT) tablet Take by mouth.      coenzyme Q-10 100 mg capsule Take by mouth.      copper gluconate 2 mg capsule Take by mouth.      fish oil concentrate (Omega-3) 120-180 mg capsule Take by mouth once daily.      lutein 6 mg tablet Take 2 tablets by mouth once daily.      phytonadione, vit K1, (vitamin k) 100 mcg tablet Take by mouth.      zinc gluconate 50 mg tablet Take 1 tablet (50 mg) by mouth once daily.      alendronate (Fosamax) 70 mg tablet TAKE 1 TABLET BY MOUTH WEEKLY 12 tablet 2    azelastine (Astelin) 137 mcg (0.1 %) nasal spray Administer 1 spray into each nostril 2 times a day. Use in each nostril as directed (Patient not taking: Reported on 6/24/2024) 30 mL 0    cetirizine (ZyrTEC) 10 mg tablet Take 1 tablet (10 mg) by mouth once daily. (Patient not taking: Reported on 6/24/2024) 30  tablet 0    montelukast (Singulair) 10 mg tablet Take 1 tablet (10 mg) by mouth once daily at bedtime. (Patient not taking: Reported on 6/24/2024) 30 tablet 0    pantoprazole (ProtoNix) 40 mg EC tablet Take 1 tablet (40 mg) by mouth once daily. Do not crush, chew, or split. 30 tablet 0    pravastatin (Pravachol) 20 mg tablet Take 1 tablet (20 mg) by mouth once daily at bedtime. 90 tablet 2     No current facility-administered medications for this visit.       Objective   Visit Vitals  /57 (BP Location: Left arm, Patient Position: Sitting)   Pulse 65   Temp 36.1 °C (97 °F)   Ht 1.524 m (5')   Wt 58.1 kg (128 lb)   SpO2 100%   BMI 25.00 kg/m²   Smoking Status Never   BSA 1.57 m²     Physical Exam  Constitutional:       General: He is not in acute distress.     Appearance: Normal appearance.   HENT:      Head: Normocephalic and atraumatic.      Nose: Nose normal.   Eyes:      Extraocular Movements: Extraocular movements intact.      Conjunctiva/sclera: Conjunctivae normal.   Cardiovascular:      Rate and Rhythm: Normal rate and regular rhythm.   Pulmonary:      Effort: Pulmonary effort is normal.      Breath sounds: Normal breath sounds.   Skin:     General: Skin is warm.   Neurological:      Mental Status: He is alert and oriented to person, place, and time.   Psychiatric:         Mood and Affect: Mood normal.         Behavior: Behavior normal.   Immunization History   Administered Date(s) Administered    Flu vaccine, quadrivalent, high-dose, preservative free, age 65y+ (FLUZONE) 10/13/2022    Influenza, High Dose Seasonal, Preservative Free 09/22/2016, 10/12/2017, 10/04/2018, 10/10/2019    Influenza, Seasonal, Quadrivalent, Adjuvanted 10/15/2020, 09/30/2021, 10/15/2023    Moderna COVID-19 vaccine, Fall 2023, 12 yeasrs and older (50mcg/0.5mL) 10/29/2023    Moderna COVID-19 vaccine, bivalent, blue cap/gray label *Check age/dose* 09/28/2022    Moderna SARS-CoV-2 Vaccination 04/07/2021, 05/05/2021, 11/11/2021,  04/04/2022    Pneumococcal conjugate vaccine, 13-valent (PREVNAR 13) 05/31/2016    Pneumococcal polysaccharide vaccine, 23-valent, age 2 years and older (PNEUMOVAX 23) 05/16/2014       Review of Systems    Lab on 04/10/2024   Component Date Value Ref Range Status    WBC 04/10/2024 5.1  4.4 - 11.3 x10*3/uL Final    nRBC 04/10/2024 0.0  0.0 - 0.0 /100 WBCs Final    RBC 04/10/2024 3.77 (L)  4.00 - 5.20 x10*6/uL Final    Hemoglobin 04/10/2024 12.3  12.0 - 16.0 g/dL Final    Hematocrit 04/10/2024 36.3  36.0 - 46.0 % Final    MCV 04/10/2024 96  80 - 100 fL Final    MCH 04/10/2024 32.6  26.0 - 34.0 pg Final    MCHC 04/10/2024 33.9  32.0 - 36.0 g/dL Final    RDW 04/10/2024 12.7  11.5 - 14.5 % Final    Platelets 04/10/2024 265  150 - 450 x10*3/uL Final    Neutrophils % 04/10/2024 66.7  40.0 - 80.0 % Final    Immature Granulocytes %, Automated 04/10/2024 0.2  0.0 - 0.9 % Final    Lymphocytes % 04/10/2024 23.0  13.0 - 44.0 % Final    Monocytes % 04/10/2024 7.5  2.0 - 10.0 % Final    Eosinophils % 04/10/2024 1.8  0.0 - 6.0 % Final    Basophils % 04/10/2024 0.8  0.0 - 2.0 % Final    Neutrophils Absolute 04/10/2024 3.40  1.60 - 5.50 x10*3/uL Final    Immature Granulocytes Absolute, Au* 04/10/2024 0.01  0.00 - 0.50 x10*3/uL Final    Lymphocytes Absolute 04/10/2024 1.17  0.80 - 3.00 x10*3/uL Final    Monocytes Absolute 04/10/2024 0.38  0.05 - 0.80 x10*3/uL Final    Eosinophils Absolute 04/10/2024 0.09  0.00 - 0.40 x10*3/uL Final    Basophils Absolute 04/10/2024 0.04  0.00 - 0.10 x10*3/uL Final    Glucose 04/10/2024 95  74 - 99 mg/dL Final    Sodium 04/10/2024 142  136 - 145 mmol/L Final    Potassium 04/10/2024 4.2  3.5 - 5.3 mmol/L Final    Chloride 04/10/2024 105  98 - 107 mmol/L Final    Bicarbonate 04/10/2024 28  21 - 32 mmol/L Final    Anion Gap 04/10/2024 13  10 - 20 mmol/L Final    Urea Nitrogen 04/10/2024 14  6 - 23 mg/dL Final    Creatinine 04/10/2024 0.97  0.50 - 1.05 mg/dL Final    eGFR 04/10/2024 60 (L)  >60  mL/min/1.73m*2 Final    Calcium 04/10/2024 9.7  8.6 - 10.6 mg/dL Final    C-Reactive Protein 04/10/2024 0.12  <1.00 mg/dL Final       Radiology: Reviewed imaging in powerchart.  No results found.    No family history on file.  Social History     Socioeconomic History    Marital status:      Spouse name: None    Number of children: None    Years of education: None    Highest education level: None   Occupational History    None   Tobacco Use    Smoking status: Never    Smokeless tobacco: Never   Substance and Sexual Activity    Alcohol use: Not Currently    Drug use: Never    Sexual activity: None   Other Topics Concern    None   Social History Narrative    None     Social Determinants of Health     Financial Resource Strain: Not on file   Food Insecurity: Not on file   Transportation Needs: Not on file   Physical Activity: Not on file   Stress: Not on file   Social Connections: Not on file   Intimate Partner Violence: Not on file   Housing Stability: Not on file     Past Medical History:   Diagnosis Date    Personal history of other endocrine, nutritional and metabolic disease 11/03/2016    History of hyperlipidemia     Past Surgical History:   Procedure Laterality Date    BREAST LUMPECTOMY  05/16/2014    Left Breast Lumpectomy    FOOT SURGERY  05/16/2014    Foot Surgery    HYSTERECTOMY  05/16/2014    Hysterectomy    KNEE ARTHROSCOPY W/ DEBRIDEMENT  05/16/2014    Arthroscopy Knee Left    OTHER SURGICAL HISTORY  05/16/2014    Left Breast Biopsy During Breast Surgery       Charting was completed using voice recognition technology and may include unintended errors.

## 2025-02-20 DIAGNOSIS — M81.0 OSTEOPOROSIS, UNSPECIFIED OSTEOPOROSIS TYPE, UNSPECIFIED PATHOLOGICAL FRACTURE PRESENCE: ICD-10-CM

## 2025-02-21 RX ORDER — ALENDRONATE SODIUM 70 MG/1
TABLET ORAL
Qty: 12 TABLET | Refills: 2 | Status: SHIPPED | OUTPATIENT
Start: 2025-02-21

## 2025-02-25 DIAGNOSIS — E78.5 HYPERLIPIDEMIA, UNSPECIFIED HYPERLIPIDEMIA TYPE: ICD-10-CM

## 2025-02-25 RX ORDER — PRAVASTATIN SODIUM 20 MG/1
20 TABLET ORAL NIGHTLY
Qty: 90 TABLET | Refills: 2 | Status: SHIPPED | OUTPATIENT
Start: 2025-02-25

## 2025-06-25 ENCOUNTER — APPOINTMENT (OUTPATIENT)
Dept: PRIMARY CARE | Facility: CLINIC | Age: 80
End: 2025-06-25
Payer: MEDICARE

## 2025-07-08 ENCOUNTER — APPOINTMENT (OUTPATIENT)
Dept: PRIMARY CARE | Facility: CLINIC | Age: 80
End: 2025-07-08
Payer: MEDICARE

## 2025-07-08 VITALS
OXYGEN SATURATION: 97 % | WEIGHT: 132 LBS | HEIGHT: 60 IN | BODY MASS INDEX: 25.91 KG/M2 | DIASTOLIC BLOOD PRESSURE: 63 MMHG | TEMPERATURE: 96.9 F | HEART RATE: 63 BPM | SYSTOLIC BLOOD PRESSURE: 127 MMHG

## 2025-07-08 DIAGNOSIS — N18.31 STAGE 3A CHRONIC KIDNEY DISEASE (MULTI): ICD-10-CM

## 2025-07-08 DIAGNOSIS — Z00.00 VISIT FOR PREVENTIVE HEALTH EXAMINATION: Primary | ICD-10-CM

## 2025-07-08 DIAGNOSIS — E78.2 MIXED HYPERLIPIDEMIA: ICD-10-CM

## 2025-07-08 PROCEDURE — 1123F ACP DISCUSS/DSCN MKR DOCD: CPT | Performed by: FAMILY MEDICINE

## 2025-07-08 PROCEDURE — 1036F TOBACCO NON-USER: CPT | Performed by: FAMILY MEDICINE

## 2025-07-08 PROCEDURE — G0439 PPPS, SUBSEQ VISIT: HCPCS | Performed by: FAMILY MEDICINE

## 2025-07-08 PROCEDURE — 1170F FXNL STATUS ASSESSED: CPT | Performed by: FAMILY MEDICINE

## 2025-07-08 PROCEDURE — 93000 ELECTROCARDIOGRAM COMPLETE: CPT | Performed by: FAMILY MEDICINE

## 2025-07-08 PROCEDURE — 1159F MED LIST DOCD IN RCRD: CPT | Performed by: FAMILY MEDICINE

## 2025-07-08 PROCEDURE — 1160F RVW MEDS BY RX/DR IN RCRD: CPT | Performed by: FAMILY MEDICINE

## 2025-07-08 PROCEDURE — G0444 DEPRESSION SCREEN ANNUAL: HCPCS | Performed by: FAMILY MEDICINE

## 2025-07-08 PROCEDURE — 99212 OFFICE O/P EST SF 10 MIN: CPT | Performed by: FAMILY MEDICINE

## 2025-07-08 PROCEDURE — 99497 ADVNCD CARE PLAN 30 MIN: CPT | Performed by: FAMILY MEDICINE

## 2025-07-08 PROCEDURE — 1157F ADVNC CARE PLAN IN RCRD: CPT | Performed by: FAMILY MEDICINE

## 2025-07-08 RX ORDER — PSYLLIUM HUSK 0.4 G
CAPSULE ORAL
COMMUNITY

## 2025-07-08 ASSESSMENT — LIFESTYLE VARIABLES
HOW OFTEN DO YOU HAVE A DRINK CONTAINING ALCOHOL: 2-4 TIMES A MONTH
AUDIT-C TOTAL SCORE: 2
HOW MANY STANDARD DRINKS CONTAINING ALCOHOL DO YOU HAVE ON A TYPICAL DAY: 1 OR 2
HOW OFTEN DO YOU HAVE SIX OR MORE DRINKS ON ONE OCCASION: NEVER
SKIP TO QUESTIONS 9-10: 1

## 2025-07-08 ASSESSMENT — ACTIVITIES OF DAILY LIVING (ADL)
DOING_HOUSEWORK: INDEPENDENT
DRESSING: INDEPENDENT
GROCERY_SHOPPING: INDEPENDENT
MANAGING_FINANCES: INDEPENDENT
BATHING: INDEPENDENT
TAKING_MEDICATION: INDEPENDENT

## 2025-07-08 ASSESSMENT — PATIENT HEALTH QUESTIONNAIRE - PHQ9
1. LITTLE INTEREST OR PLEASURE IN DOING THINGS: NOT AT ALL
SUM OF ALL RESPONSES TO PHQ9 QUESTIONS 1 AND 2: 0
2. FEELING DOWN, DEPRESSED OR HOPELESS: NOT AT ALL

## 2025-07-08 NOTE — PROGRESS NOTES
Subjective   Patient ID: Charity Weber is a 79 y.o. female who presents for Medicare Annual Wellness Visit Subsequent (Discuss taking fosamax for extended time).    Assessment/Plan     Problem List Items Addressed This Visit       Visit for preventive health examination - Primary    Hyperlipidemia    Relevant Orders    ECG 12 lead (Clinic Performed) (Completed)    Stage 3a chronic kidney disease (Multi)     Mammogram and bone density July 2024 reviewed   Consider mammogram next year    Time spent around 10 minutes obtaining and discussing depression screening using PHQ 9 questions with results documented in the chart      Received new shingles vaccine  Up-to-date with pneumonia vaccine  Up-to-date with COVID-19 vaccine    cologuard -positive in September 2020 had colonoscopy in November 2020  Declined further colon cancer screening  Patient can call for medication refills  Follow-up in 12 months. Patient understands and in agreement with plan    HPI  79-year-old female here for Medicare wellness visit    No new signs and symptoms        follow-up on on recent CT colonography which was done as patient could not complete colonoscopy the result for CT colonography did not reveal any abnormalities except mild diverticulosis.   Echocardiogram showed EF of 55 to 60% small pericardial effusion       hyperlipidemia, vitamin D deficiency, chronic low back pain  Osteoporosis/osteopenia  Chronic kidney disease stage III  No new sign and symptoms     Recent lab work done reviewed scanned 5/28/25      Right ear minimal cerumen removed through curette    Advance directives:. Advanced Care Planning discussed and documented advance care plan or surrogate decision maker documented in the medical record.   A Conversation about Advance directives of at least 16 minutes has occurred. Actual time spent: I spent >16 minutes discussing Advance Care Planning including the explanation and discussion of advance directives. If patient does not  have current up to date documents, examples and information provided on how to create both Living Will and Power of . Patient was encouraged to work on completing these documents.  Conversation with: The conversation with the patient and/or participants was voluntary.  Documents discussed and/or completed: Living Will was discussed with participants. Healthcare POA was discussed with participants. Patient educated on how to obtain Living Will and Power of . Discussed patient end of life choices.   Patient has living will and patient's -Bladimir daughter is healthcare power of .  Patient is full code.    Allergies   Allergen Reactions    Methylprednisolone Unknown    Silicone Unknown       Current Outpatient Medications   Medication Sig Dispense Refill    beta carotene (Vitamin A) 7,500 mcg (25,000 unit) capsule Take 1 capsule (25,000 Units) by mouth once daily.      calcium carbonate-vitamin D3 1,000 mg-20 mcg (800 unit) tablet Take by mouth.      cholecalciferol (Vitamin D-3) 25 MCG (1000 UT) tablet Take by mouth.      coenzyme Q-10 100 mg capsule Take by mouth.      copper gluconate 2 mg capsule Take by mouth.      fish oil concentrate (Omega-3) 120-180 mg capsule Take by mouth once daily.      lutein 6 mg tablet Take 2 tablets by mouth once daily.      phytonadione, vit K1, (vitamin k) 100 mcg tablet Take by mouth.      pravastatin (Pravachol) 20 mg tablet TAKE 1 TABLET BY MOUTH ONCE  DAILY AT BEDTIME 90 tablet 2    zinc gluconate 50 mg tablet Take 1 tablet by mouth once daily.       No current facility-administered medications for this visit.       Objective   Visit Vitals  /63 (BP Location: Left arm, Patient Position: Sitting)   Pulse 63   Temp 36.1 °C (96.9 °F)   Ht (!) 1.524 m (5')   Wt 59.9 kg (132 lb)   SpO2 97%   BMI 25.78 kg/m²   Smoking Status Never   BSA 1.59 m²     Physical Exam  Constitutional:       General: He is not in acute distress.     Appearance: Normal  appearance.   HENT:      Head: Normocephalic and atraumatic.      Nose: Nose normal.   Eyes:      Extraocular Movements: Extraocular movements intact.      Conjunctiva/sclera: Conjunctivae normal.   Cardiovascular:      Rate and Rhythm: Normal rate and regular rhythm.   Pulmonary:      Effort: Pulmonary effort is normal.      Breath sounds: Normal breath sounds.   Skin:     General: Skin is warm.   Neurological:      Mental Status: He is alert and oriented to person, place, and time.   Psychiatric:         Mood and Affect: Mood normal.         Behavior: Behavior normal.   Immunization History   Administered Date(s) Administered    Flu vaccine, quadrivalent, high-dose, preservative free, age 65y+ (FLUZONE) 10/13/2022    Flu vaccine, trivalent, preservative free, HIGH-DOSE, age 65y+ (Fluzone) 09/22/2016, 10/12/2017, 10/04/2018, 10/10/2019, 10/12/2024    Flu vaccine, trivalent, preservative free, age 6 months and greater (Fluarix/Fluzone/Flulaval) 09/03/2015    Influenza, Seasonal, Quadrivalent, Adjuvanted 10/15/2020, 09/30/2021, 10/15/2023    Moderna COVID-19 vaccine, 12 years and older (50mcg/0.5mL)(Spikevax) 10/29/2023, 10/05/2024    Moderna COVID-19 vaccine, bivalent, blue cap/gray label *Check age/dose* 09/28/2022    Moderna SARS-CoV-2 Vaccination 04/07/2021, 05/05/2021, 11/11/2021, 04/04/2022    Pneumococcal conjugate vaccine, 13-valent (PREVNAR 13) 05/31/2016    Pneumococcal polysaccharide vaccine, 23-valent, age 2 years and older (PNEUMOVAX 23) 05/16/2014    RSV, 60 Years And Older (AREXVY) 10/26/2024    Td vaccine, age 7 years and older (TDVAX) 10/28/2011    Zoster vaccine, recombinant, adult (SHINGRIX) 06/10/2019, 07/11/2019, 09/12/2019       Review of Systems    No visits with results within 4 Month(s) from this visit.   Latest known visit with results is:   Lab on 04/10/2024   Component Date Value Ref Range Status    WBC 04/10/2024 5.1  4.4 - 11.3 x10*3/uL Final    nRBC 04/10/2024 0.0  0.0 - 0.0 /100 WBCs  Final    RBC 04/10/2024 3.77 (L)  4.00 - 5.20 x10*6/uL Final    Hemoglobin 04/10/2024 12.3  12.0 - 16.0 g/dL Final    Hematocrit 04/10/2024 36.3  36.0 - 46.0 % Final    MCV 04/10/2024 96  80 - 100 fL Final    MCH 04/10/2024 32.6  26.0 - 34.0 pg Final    MCHC 04/10/2024 33.9  32.0 - 36.0 g/dL Final    RDW 04/10/2024 12.7  11.5 - 14.5 % Final    Platelets 04/10/2024 265  150 - 450 x10*3/uL Final    Neutrophils % 04/10/2024 66.7  40.0 - 80.0 % Final    Immature Granulocytes %, Automated 04/10/2024 0.2  0.0 - 0.9 % Final    Lymphocytes % 04/10/2024 23.0  13.0 - 44.0 % Final    Monocytes % 04/10/2024 7.5  2.0 - 10.0 % Final    Eosinophils % 04/10/2024 1.8  0.0 - 6.0 % Final    Basophils % 04/10/2024 0.8  0.0 - 2.0 % Final    Neutrophils Absolute 04/10/2024 3.40  1.60 - 5.50 x10*3/uL Final    Immature Granulocytes Absolute, Au* 04/10/2024 0.01  0.00 - 0.50 x10*3/uL Final    Lymphocytes Absolute 04/10/2024 1.17  0.80 - 3.00 x10*3/uL Final    Monocytes Absolute 04/10/2024 0.38  0.05 - 0.80 x10*3/uL Final    Eosinophils Absolute 04/10/2024 0.09  0.00 - 0.40 x10*3/uL Final    Basophils Absolute 04/10/2024 0.04  0.00 - 0.10 x10*3/uL Final    Glucose 04/10/2024 95  74 - 99 mg/dL Final    Sodium 04/10/2024 142  136 - 145 mmol/L Final    Potassium 04/10/2024 4.2  3.5 - 5.3 mmol/L Final    Chloride 04/10/2024 105  98 - 107 mmol/L Final    Bicarbonate 04/10/2024 28  21 - 32 mmol/L Final    Anion Gap 04/10/2024 13  10 - 20 mmol/L Final    Urea Nitrogen 04/10/2024 14  6 - 23 mg/dL Final    Creatinine 04/10/2024 0.97  0.50 - 1.05 mg/dL Final    eGFR 04/10/2024 60 (L)  >60 mL/min/1.73m*2 Final    Calcium 04/10/2024 9.7  8.6 - 10.6 mg/dL Final    C-Reactive Protein 04/10/2024 0.12  <1.00 mg/dL Final       Radiology: Reviewed imaging in powerchart.  No results found.    No family history on file.  Social History     Socioeconomic History    Marital status:    Tobacco Use    Smoking status: Never    Smokeless tobacco: Never    Substance and Sexual Activity    Alcohol use: Not Currently    Drug use: Never     Past Medical History:   Diagnosis Date    Personal history of other endocrine, nutritional and metabolic disease 11/03/2016    History of hyperlipidemia     Past Surgical History:   Procedure Laterality Date    BREAST LUMPECTOMY  05/16/2014    Left Breast Lumpectomy    FOOT SURGERY  05/16/2014    Foot Surgery    HYSTERECTOMY  05/16/2014    Hysterectomy    KNEE ARTHROSCOPY W/ DEBRIDEMENT  05/16/2014    Arthroscopy Knee Left    OTHER SURGICAL HISTORY  05/16/2014    Left Breast Biopsy During Breast Surgery       Charting was completed using voice recognition technology and may include unintended errors.

## 2026-07-08 ENCOUNTER — APPOINTMENT (OUTPATIENT)
Dept: PRIMARY CARE | Facility: CLINIC | Age: 81
End: 2026-07-08
Payer: MEDICARE